# Patient Record
(demographics unavailable — no encounter records)

---

## 2024-11-05 NOTE — HISTORY OF PRESENT ILLNESS
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  He was initially referred for evaluation of his growth and consulted on Jan 2021. Based on his growth chart Sergey has been growing along the 25th percentile up until age 8. At which point he began a subtle deceleration to the 10th percentile. Weight has also experienced a subtle deceleration from the 10th to the 5th percentile. The pediatrician had performed celiac screening, due to a family history of celiac disease in a sibling. The results were negative. Srinivasan is a healthy child. He has not been seen by any other specialists. He is being treated for anxiety on Zoloft and is seeing a therapist.  At the time of the initial visit on 1/20/21, bloodwork was normal, bone age was normal for age. On his follow up on 6/18/21, he had a growth rate of 5.14 cm/year which was normal for his pubertal status. Srinivasan's mid parental height is 65.25in (familial short stature mother 61.5in and father 64in). Patient was seen in 8/10/21 and was growing slowly and we proceeded with a Premarin primed growth hormone stimulation test and failed it with a peak of 8.60. He had a normal MRI and was started on GH 1.1 mg daily in Oct 2021. In summer of 2023 he changed the GH treatment to weekly, with Skytrofa. He was last seen in 6/2024, he grew at a rate of 4.27 cm/yr. Last safety labs were done in 2/2024 and showed slightly elevated IGF-1, appropriate to pubertal status.   Srinivasan returns to clinic today. He has been well and taking his growth hormone weekly, on Sundays. He takes 11 mg (0.22 mg/kg/week). His mother reports he eats well.

## 2024-11-05 NOTE — ASSESSMENT
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 He takes Skytrofa 11 mg (0.22 mg/kg/week). Today his height is at the 22nd percentile. BMI is at the 5th percentile (was 4th). His mother reports he eats well. He grew at a rate of 3.68 cm/yr, slower than before. I ordered some safety labs.

## 2024-11-05 NOTE — CONSULT LETTER
[Dear  ___] : Dear  [unfilled], [Courtesy Letter:] : I had the pleasure of seeing your patient, [unfilled], in my office today. [Please see my note below.] : Please see my note below. [Consult Closing:] : Thank you very much for allowing me to participate in the care of this patient.  If you have any questions, please do not hesitate to contact me. [Sincerely,] : Sincerely, [FreeTextEntry3] : Tarng Barroso MD [facility] Division of Pediatric Endocrinology  [facility] [employer] [facility] at [facility]

## 2025-03-23 NOTE — CONSULT LETTER
[Dear  ___] : Dear  [unfilled], [Courtesy Letter:] : I had the pleasure of seeing your patient, [unfilled], in my office today. [Please see my note below.] : Please see my note below. [Consult Closing:] : Thank you very much for allowing me to participate in the care of this patient.  If you have any questions, please do not hesitate to contact me. [Sincerely,] : Sincerely, [FreeTextEntry3] : Trang Barroso MD [facility] Division of Pediatric Endocrinology  [facility] [employer] [facility] at [facility]

## 2025-03-23 NOTE — CONSULT LETTER
[Dear  ___] : Dear  [unfilled], [Courtesy Letter:] : I had the pleasure of seeing your patient, [unfilled], in my office today. [Please see my note below.] : Please see my note below. [Consult Closing:] : Thank you very much for allowing me to participate in the care of this patient.  If you have any questions, please do not hesitate to contact me. [Sincerely,] : Sincerely, [FreeTextEntry3] : Trang Barroso MD [facility] Division of Pediatric Endocrinology  [facility] [employer] [facility] at [facility]

## 2025-03-23 NOTE — HISTORY OF PRESENT ILLNESS
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   He was initially referred for evaluation of his growth and consulted on Jan 2021. Based on his growth chart Sergey has been growing along the 25th percentile up until age 8. At which point he began a subtle deceleration to the 10th percentile. Weight has also experienced a subtle deceleration from the 10th to the 5th percentile. The pediatrician had performed celiac screening, due to a family history of celiac disease in a sibling. The results were negative. Srinivasan is a healthy child. He has not been seen by any other specialists. He is being treated for anxiety on Zoloft and is seeing a therapist. At the time of the initial visit on 1/20/21, bloodwork was normal, bone age was normal for age. On his follow up on 6/18/21, he had a growth rate of 5.14 cm/year which was normal for his pubertal status. Srinivasan's mid parental height is 65.25in (familial short stature mother 61.5in and father 64in). Patient was seen in 8/10/21 and was growing slowly and we proceeded with a Premarin primed growth hormone stimulation test and failed it with a peak of 8.60. He had a normal MRI and was started on GH 1.1 mg daily in Oct 2021. In summer of 2023 he changed the GH treatment to weekly, with Skytrofa. He transferred care to Dr Alston in Nov 2024.   He was last seen in 11/2024, takes Skytrofa 11 mg (0.22 mg/kg/week). He grew at a rate of 3.68 cm/yr. Last safety labs were done in 2/2025 and showed elevated IGF-1, appropriate for pubertal status.   Srinivasan returns to clinic today. He has been well and taking his growth hormone weekly himself, on Sundays. He injects in his thighs. He takes 11 mg (0.21 mg/kg/week). His mother reports he eats well.  No side effects from injections.

## 2025-03-23 NOTE — END OF VISIT
[] : Fellow [FreeTextEntry3] : I discussed this patient in a pre-clinic session with the fellow including review of clinical status and last labs.  I also saw the patient and discussed history, completed an exam and discussed the plan together with the fellow. [Time Spent: ___ minutes] : I have spent [unfilled] minutes of time on the encounter which excludes teaching and separately reported services.

## 2025-03-23 NOTE — END OF VISIT
[FreeTextEntry3] : I discussed this patient in a pre-clinic session with the fellow including review of clinical status and last labs.  I also saw the patient and discussed history, completed an exam and discussed the plan together with the fellow. [] : Fellow [Time Spent: ___ minutes] : I have spent [unfilled] minutes of time on the encounter which excludes teaching and separately reported services.

## 2025-03-23 NOTE — CONSULT LETTER
[Dear  ___] : Dear  [unfilled], [Courtesy Letter:] : I had the pleasure of seeing your patient, [unfilled], in my office today. [Please see my note below.] : Please see my note below. [Consult Closing:] : Thank you very much for allowing me to participate in the care of this patient.  If you have any questions, please do not hesitate to contact me. [Sincerely,] : Sincerely, [FreeTextEntry3] : Trang Barroso MD [facility] Division of Pediatric Endocrinology  [facility]

## 2025-03-23 NOTE — CONSULT LETTER
[Dear  ___] : Dear  [unfilled], [Courtesy Letter:] : I had the pleasure of seeing your patient, [unfilled], in my office today. [Please see my note below.] : Please see my note below. [Consult Closing:] : Thank you very much for allowing me to participate in the care of this patient.  If you have any questions, please do not hesitate to contact me. [Sincerely,] : Sincerely, [FreeTextEntry3] : Trang Barroso MD [facility] Division of Pediatric Endocrinology  [facility] at [facility]

## 2025-03-23 NOTE — ASSESSMENT
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 He takes Skytrofa 11 mg (0.21 mg/kg/week). Since last visit he grew 0.8cm and gained 1.7kg. Today his height is at the 23rd percentile. BMI is at the 7th percentile (was at the 5th last visit). His mother reports he eats well. He grew at a rate of 2.18 cm/yr, which is slow, and he is likely nearing the end of his growth. His recent labs show elevated IGF-1, appropriate for pubertal status. Given the low growth velocity, a bone age will be obtained to determine whether his growth plates are near closing. If the bone age is 16 years or older, growth hormone will be discontinued. The family is overall happy with his progress thus far. We will call them to discuss the results.